# Patient Record
Sex: FEMALE | Race: BLACK OR AFRICAN AMERICAN | NOT HISPANIC OR LATINO | URBAN - NONMETROPOLITAN AREA
[De-identification: names, ages, dates, MRNs, and addresses within clinical notes are randomized per-mention and may not be internally consistent; named-entity substitution may affect disease eponyms.]

---

## 2022-09-22 ENCOUNTER — OFFICE (OUTPATIENT)
Dept: URBAN - NONMETROPOLITAN AREA CLINIC 9 | Facility: CLINIC | Age: 32
End: 2022-09-22

## 2022-09-22 VITALS — HEART RATE: 83 BPM | SYSTOLIC BLOOD PRESSURE: 140 MMHG | HEIGHT: 64 IN | WEIGHT: 284 LBS

## 2022-09-22 DIAGNOSIS — K92.1 MELENA: ICD-10-CM

## 2022-09-22 DIAGNOSIS — E66.01 MORBID (SEVERE) OBESITY DUE TO EXCESS CALORIES: ICD-10-CM

## 2022-09-22 PROCEDURE — 99204 OFFICE O/P NEW MOD 45 MIN: CPT | Mod: 95

## 2022-09-22 NOTE — SERVICENOTES
This patient is being seen today via telehealth and is aware of the limitations of telemedicine and gives verbal consent to proceed with the visit. This visit was completed via audio/visual ZOOM due to the restrictions of the COVID-19 pandemic.  All issues as stated above were discussed and addressed with the patient.  This telemedicine visit took place with the patient in the office and my assistant, Carolin Villanueva LPN present.

Start time:10:25
End time:10;40

## 2022-09-22 NOTE — SERVICEHPINOTES
SEN AYALA   is a   33 yo  female   whom I am seeing as a new patient today. for hematochezia.  she reports having a colonoscopy about 1 year ago by Dr. Zamorano.  she does not recall being told about ulcerative colitis but she was started on Lialda.  She denies any diarrhea or unintentional weight loss she was recently started on Adipex.  She denies any significant abdominal pain.  She reports normal formed stools.  She has bright red blood per rectum with stools and between bowel movements.  She has not had a recent blood work done..